# Patient Record
Sex: MALE | Race: WHITE | NOT HISPANIC OR LATINO | Employment: OTHER | URBAN - METROPOLITAN AREA
[De-identification: names, ages, dates, MRNs, and addresses within clinical notes are randomized per-mention and may not be internally consistent; named-entity substitution may affect disease eponyms.]

---

## 2017-03-24 ENCOUNTER — TRANSCRIBE ORDERS (OUTPATIENT)
Dept: ADMINISTRATIVE | Facility: HOSPITAL | Age: 82
End: 2017-03-24

## 2017-03-24 DIAGNOSIS — C44.219 BASAL CELL CARCINOMA OF LEFT EAR: Primary | ICD-10-CM

## 2017-04-03 ENCOUNTER — APPOINTMENT (OUTPATIENT)
Dept: INFUSION THERAPY | Facility: HOSPITAL | Age: 82
End: 2017-04-03
Attending: DERMATOLOGY

## 2017-04-04 ENCOUNTER — HOSPITAL ENCOUNTER (OUTPATIENT)
Dept: INFUSION THERAPY | Facility: HOSPITAL | Age: 82
Discharge: HOME | End: 2017-04-04
Attending: DERMATOLOGY

## 2017-04-04 RX ORDER — LIDOCAINE HYDROCHLORIDE AND EPINEPHRINE 10; 10 MG/ML; UG/ML
20 INJECTION, SOLUTION INFILTRATION; PERINEURAL ONCE
Status: DISCONTINUED | OUTPATIENT
Start: 2017-04-04 | End: 2017-04-07 | Stop reason: HOSPADM

## 2018-09-07 ENCOUNTER — HOSPITAL ENCOUNTER (EMERGENCY)
Facility: HOSPITAL | Age: 83
Discharge: HOME | End: 2018-09-07
Attending: EMERGENCY MEDICINE | Admitting: EMERGENCY MEDICINE

## 2018-09-07 ENCOUNTER — APPOINTMENT (OUTPATIENT)
Dept: CT IMAGING | Facility: HOSPITAL | Age: 83
End: 2018-09-07

## 2018-09-07 VITALS
SYSTOLIC BLOOD PRESSURE: 143 MMHG | OXYGEN SATURATION: 97 % | WEIGHT: 145 LBS | DIASTOLIC BLOOD PRESSURE: 84 MMHG | BODY MASS INDEX: 22.76 KG/M2 | TEMPERATURE: 97.7 F | RESPIRATION RATE: 15 BRPM | HEIGHT: 67 IN | HEART RATE: 54 BPM
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
## 2018-09-07 DIAGNOSIS — G20 PARKINSON'S DISEASE (HCC): ICD-10-CM

## 2018-09-07 DIAGNOSIS — F03.90 DEMENTIA WITHOUT BEHAVIORAL DISTURBANCE, UNSPECIFIED DEMENTIA TYPE: ICD-10-CM

## 2018-09-07 LAB — GLUCOSE BLDC GLUCOMTR-MCNC: 89 MG/DL (ref 70–130)

## 2018-09-07 PROCEDURE — 72125 CT NECK SPINE W/O DYE: CPT

## 2018-09-07 PROCEDURE — 70450 CT HEAD/BRAIN W/O DYE: CPT

## 2018-09-07 PROCEDURE — 99284 EMERGENCY DEPT VISIT MOD MDM: CPT

## 2018-09-07 PROCEDURE — 82962 GLUCOSE BLOOD TEST: CPT

## 2018-09-07 RX ORDER — LANOLIN ALCOHOL/MO/W.PET/CERES
6 CREAM (GRAM) TOPICAL NIGHTLY
COMMUNITY

## 2018-09-07 NOTE — ED PROVIDER NOTES
EMERGENCY DEPARTMENT ENCOUNTER    CHIEF COMPLAINT  Chief Complaint: Head Injury   History given by: Pt and family  History limited by: Dementia   Room Number: 33/33  PMD: Provider, No Known      HPI:  Pt is a 89 y.o. male who presents from memory care unit after sustaining an unwitnessed fall in his shower earlier today.  He is demented and cannot give much history.  He was transported via EMS on long board and c-collar. His daughter shemar him here at the ER and supplements the history.    Duration:  PTA to ED  Onset: sudden from fall  Timing: constant  Location: head  Radiation: none  Quality: pain  Intensity/Severity: moderate  Progression: unchanged  Associated Symptoms: none  Aggravating Factors: none  Alleviating Factors: none  Previous Episodes: Pt has hx of falls.   Treatment before arrival: none stated     PAST MEDICAL HISTORY  Active Ambulatory Problems     Diagnosis Date Noted   • No Active Ambulatory Problems     Resolved Ambulatory Problems     Diagnosis Date Noted   • No Resolved Ambulatory Problems     Past Medical History:   Diagnosis Date   • Dementia    • Parkinson's disease (CMS/HCC)    • Skin cancer        PAST SURGICAL HISTORY  Past Surgical History:   Procedure Laterality Date   • BRAIN STIMULATOR     • COLON SURGERY     • HERNIA REPAIR         FAMILY HISTORY  History reviewed. No pertinent family history.    SOCIAL HISTORY  Social History     Social History   • Marital status: Single     Spouse name: N/A   • Number of children: N/A   • Years of education: N/A     Occupational History   • Not on file.     Social History Main Topics   • Smoking status: Never Smoker   • Smokeless tobacco: Never Used   • Alcohol use No   • Drug use: No   • Sexual activity: Defer     Other Topics Concern   • Not on file     Social History Narrative   • No narrative on file       ALLERGIES  Sulfa antibiotics    REVIEW OF SYSTEMS  Review of Systems   Unable to perform ROS: Dementia   Musculoskeletal: Negative for neck  pain.   Neurological:        Pt had head injury.        PHYSICAL EXAM  ED Triage Vitals   Temp Heart Rate Resp BP SpO2   09/07/18 1358 09/07/18 1357 09/07/18 1358 09/07/18 1357 09/07/18 1357   97.7 °F (36.5 °C) 56 18 145/74 97 %      Temp src Heart Rate Source Patient Position BP Location FiO2 (%)   09/07/18 1358 -- -- -- --   Oral           Physical Exam   Constitutional: He is oriented to person, place, and time. No distress.   HENT:   Head: Normocephalic and atraumatic.   Eyes: Pupils are equal, round, and reactive to light. EOM are normal.   Neck: Normal range of motion. Neck supple.   Cardiovascular: Normal rate, regular rhythm and normal heart sounds.    Pulmonary/Chest: Effort normal and breath sounds normal. No respiratory distress.   Abdominal: Soft. There is no tenderness. There is no rebound and no guarding.   Musculoskeletal: Normal range of motion. He exhibits no edema.   Neurological: He is alert and oriented to person, place, and time. He has normal sensation and normal strength.   Speech mumbling, but is at baseline per daughter. Moves all extremities well.    Skin: Skin is warm and dry.   Psychiatric: Mood and affect normal.   Nursing note and vitals reviewed.      LAB RESULTS  Lab Results (last 24 hours)     Procedure Component Value Units Date/Time    POC Glucose Once [[id number]]  (Normal) Collected:  09/07/18 1411    Specimen:  Blood Updated:  09/07/18 1423     Glucose 89 mg/dL     Narrative:       Meter: [id number] : [id number] Meryl VILLEGAS I ordered the above labs and reviewed the results    RADIOLOGY  CT Head Without Contrast   Preliminary Result   No acute process.       CT OF THE CERVICAL SPINE WITHOUT CONTRAST.       TECHNIQUE: Axial images were obtained from the skull base to the upper   thoracic spine.       Sagittal and coronal reconstruction images were reviewed.       There is degenerative change of the C1-C2 articulation.       There is minimal anterolisthesis of C6  on C7. No other subluxation is   seen. There is mild multilevel cervical spondylosis. No significant   spinal canal stenosis is seen. Multilevel facet joint arthropathy is   seen.       No cervical spine fractures are seen.       IMPRESSION:    1. Multilevel cervical degenerative disease.   2. No cervical spine fractures are seen.       Radiation dose reduction techniques were utilized, including automated   exposure control and exposure modulation based on body size.              CT Cervical Spine Without Contrast   Preliminary Result   No acute process.       CT OF THE CERVICAL SPINE WITHOUT CONTRAST.       TECHNIQUE: Axial images were obtained from the skull base to the upper   thoracic spine.       Sagittal and coronal reconstruction images were reviewed.       There is degenerative change of the C1-C2 articulation.       There is minimal anterolisthesis of C6 on C7. No other subluxation is   seen. There is mild multilevel cervical spondylosis. No significant   spinal canal stenosis is seen. Multilevel facet joint arthropathy is   seen.       No cervical spine fractures are seen.       IMPRESSION:    1. Multilevel cervical degenerative disease.   2. No cervical spine fractures are seen.       Radiation dose reduction techniques were utilized, including automated   exposure control and exposure modulation based on body size.                   I ordered the above noted radiological studies. Interpreted by radiologist. Reviewed by me in PACS.       PROCEDURES  Procedures      PROGRESS AND CONSULTS       1439  CT Head, CT Cervical Spine ordered.       MEDICAL DECISION MAKING  Results were reviewed/discussed with the patient and they were also made aware of online access. Pt also made aware that some labs, such as cultures, will not be resulted during ER visit and follow up with PMD is necessary.     MDM  Number of Diagnoses or Management Options  Dementia without behavioral disturbance, unspecified dementia type:    Fall, initial encounter:   Parkinson's disease (CMS/[facility]):      Amount and/or Complexity of Data Reviewed  Clinical lab tests: reviewed and ordered (Glucose=89)  Tests in the radiology section of CPT®: reviewed and ordered (CT Head- nothing acute.   CT C-Spine- nothing acute. )  Decide to obtain previous medical records or to obtain history from someone other than the patient: yes  Obtain history from someone other than the patient: yes (Pt's family. )  Review and summarize past medical records: yes  Independent visualization of images, tracings, or specimens: yes           DIAGNOSIS  Final diagnoses:   Fall, initial encounter   Dementia without behavioral disturbance, unspecified dementia type   Parkinson's disease (CMS/[facility])       DISPOSITION  DISCHARGE    Patient discharged in stable condition.    Reviewed implications of results, diagnosis, meds, responsibility to follow up, warning signs and symptoms of possible worsening, potential complications and reasons to return to ER.    Patient/Family voiced understanding of above instructions.    Discussed plan for discharge, as there is no emergent indication for admission. Patient referred to primary care provider for BP management due to today's BP. Pt/family is agreeable and understands need for follow up and repeat testing.  Pt is aware that discharge does not mean that nothing is wrong but it indicates no emergency is present that requires admission and they must continue care with follow-up as given below or physician of their choice.     FOLLOW-UP  PATIENT LIAISON [facility] [address] [phone]  Schedule an appointment as soon as possible for a visit   As needed    [facility]  [address] [facility] [address] [phone]    As needed         Medication List      No changes were made to your prescriptions during this visit.           Latest Documented Vital Signs:  As of 5:08 PM  BP-  145/74 HR- 56 Temp- 97.7 °F (36.5 °C) (Oral) O2 sat- 97%    --  Documentation assistance provided by romeo Arevalo for Dr. Parr. Information recorded by the romeo was done at my direction and has been verified and validated by me.            Shannon Gilliland  09/07/18 8430       Justin Parr MD  09/07/18 0901

## 2018-09-07 NOTE — ED NOTES
"Pt to room 33 via ems.  Pt on backboard and collar.  Reported pt lives at [facility] and fell in shower.  Emergency contact Mayra Netherton called to get pt baseline status.  Was told that pt has dementia and parkinson's and that baseline is confusion and slurred slow speech.  Stated that she would call daughter and get her to call us with history and med list.  Pt lives at [facility] assisted living in \"[facility] area\" per Mayra.      Prashanth Brown RN  09/07/18 5377    "

## 2019-02-18 ENCOUNTER — APPOINTMENT (OUTPATIENT)
Dept: GENERAL RADIOLOGY | Facility: HOSPITAL | Age: 84
End: 2019-02-18

## 2019-02-18 ENCOUNTER — HOSPITAL ENCOUNTER (EMERGENCY)
Facility: HOSPITAL | Age: 84
Discharge: HOME | End: 2019-02-18
Attending: EMERGENCY MEDICINE | Admitting: EMERGENCY MEDICINE

## 2019-02-18 ENCOUNTER — APPOINTMENT (OUTPATIENT)
Dept: CT IMAGING | Facility: HOSPITAL | Age: 84
End: 2019-02-18

## 2019-02-18 VITALS
HEIGHT: 68 IN | DIASTOLIC BLOOD PRESSURE: 74 MMHG | RESPIRATION RATE: 18 BRPM | WEIGHT: 136.1 LBS | BODY MASS INDEX: 20.63 KG/M2 | OXYGEN SATURATION: 92 % | SYSTOLIC BLOOD PRESSURE: 122 MMHG | TEMPERATURE: 99.4 F | HEART RATE: 61 BPM

## 2019-02-18 DIAGNOSIS — S70.212A ABRASION OF LEFT HIP, INITIAL ENCOUNTER: ICD-10-CM
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
## 2019-02-18 DIAGNOSIS — S80.212A ABRASION OF LEFT KNEE, INITIAL ENCOUNTER: ICD-10-CM

## 2019-02-18 DIAGNOSIS — S09.90XA CLOSED HEAD INJURY, INITIAL ENCOUNTER: ICD-10-CM

## 2019-02-18 DIAGNOSIS — S00.83XA TRAUMATIC HEMATOMA OF FOREHEAD, INITIAL ENCOUNTER: ICD-10-CM

## 2019-02-18 DIAGNOSIS — R52 PAIN: ICD-10-CM

## 2019-02-18 DIAGNOSIS — S51.012A SKIN TEAR OF LEFT ELBOW WITHOUT COMPLICATION, INITIAL ENCOUNTER: ICD-10-CM

## 2019-02-18 PROCEDURE — 90471 IMMUNIZATION ADMIN: CPT | Performed by: EMERGENCY MEDICINE

## 2019-02-18 PROCEDURE — 73560 X-RAY EXAM OF KNEE 1 OR 2: CPT

## 2019-02-18 PROCEDURE — 90715 TDAP VACCINE 7 YRS/> IM: CPT | Performed by: EMERGENCY MEDICINE

## 2019-02-18 PROCEDURE — 73502 X-RAY EXAM HIP UNI 2-3 VIEWS: CPT

## 2019-02-18 PROCEDURE — 25010000002 TDAP 5-2.5-18.5 LF-MCG/0.5 SUSPENSION: Performed by: EMERGENCY MEDICINE

## 2019-02-18 PROCEDURE — 99283 EMERGENCY DEPT VISIT LOW MDM: CPT

## 2019-02-18 PROCEDURE — 70450 CT HEAD/BRAIN W/O DYE: CPT

## 2019-02-18 PROCEDURE — 72125 CT NECK SPINE W/O DYE: CPT

## 2019-02-18 PROCEDURE — 73070 X-RAY EXAM OF ELBOW: CPT

## 2019-02-18 RX ADMIN — TETANUS TOXOID, REDUCED DIPHTHERIA TOXOID AND ACELLULAR PERTUSSIS VACCINE, ADSORBED 0.5 ML: 5; 2.5; 8; 8; 2.5 SUSPENSION INTRAMUSCULAR at 02:57

## 2019-02-18 NOTE — ED NOTES
Pt here for fall at nursing facility.  Hematoma middle of forehead, skin tear left knee, abrasion left lateral knee, abrasion to left hip.  Pt moves all extremities without obvious pain.  No acute distress noted at this time.      Netta Wang RN  02/18/19 0115

## 2019-02-18 NOTE — ED PROVIDER NOTES
EMERGENCY DEPARTMENT ENCOUNTER    Room Number:  20/20  Date seen:  2/18/2019  Time seen: 1:02 AM  PCP: System, Provider Not In  Historian: patient      HPI:  Chief Complaint: head injury  A complete HPI/ROS/PMH/PSH/SH/FH are unobtainable due to: dementia    Context: Nayely Wang is a 89 y.o. male who presents to the ED with a reported head injury after falling around 0000 tonight. Pt reportedly fell while walking from his bed to the bathroom. There was no reported LOC. Pt is unable to provide any history.    Location: forehead  Radiation: none  Quality: contusion  Intensity/Severity: unknown  Duration: one hour  Onset quality: sudden  Timing: constant  Progression: unchanged  Aggravating Factors: unknown  Alleviating Factors: unknown  Previous Episodes: none mentioned  Treatment before arrival: none  Associated Symptoms: unknown    PAST MEDICAL HISTORY  Active Ambulatory Problems     Diagnosis Date Noted   • No Active Ambulatory Problems     Resolved Ambulatory Problems     Diagnosis Date Noted   • No Resolved Ambulatory Problems     Past Medical History:   Diagnosis Date   • Dementia    • Parkinson's disease (CMS/HCC)    • Skin cancer          PAST SURGICAL HISTORY  Past Surgical History:   Procedure Laterality Date   • BRAIN STIMULATOR     • COLON SURGERY     • HERNIA REPAIR           FAMILY HISTORY  No family history on file.      SOCIAL HISTORY  Social History     Socioeconomic History   • Marital status: Single     Spouse name: Not on file   • Number of children: Not on file   • Years of education: Not on file   • Highest education level: Not on file   Social Needs   • Financial resource strain: Not on file   • Food insecurity - worry: Not on file   • Food insecurity - inability: Not on file   • Transportation needs - medical: Not on file   • Transportation needs - non-medical: Not on file   Occupational History   • Not on file   Tobacco Use   • Smoking status: Never Smoker   • Smokeless tobacco: Never  Used   Substance and Sexual Activity   • Alcohol use: No   • Drug use: No   • Sexual activity: Defer   Other Topics Concern   • Not on file   Social History Narrative   • Not on file         ALLERGIES  Sulfa antibiotics        REVIEW OF SYSTEMS  Review of Systems   Unable to perform ROS: Dementia            PHYSICAL EXAM  ED Triage Vitals [02/18/19 0058]   Temp Heart Rate Resp BP SpO2   99.4 °F (37.4 °C) 64 20 128/70 95 %      Temp src Heart Rate Source Patient Position BP Location FiO2 (%)   Tympanic Monitor Sitting Left arm --         GENERAL: not distressed, appears elderly  HENT: nares patent, hematoma to middle forehead  EYES: no scleral icterus  NECK: no C-Spine tenderness, FROM of C-Spine without pain  CV: regular rhythm, regular rate, 2+ DP pulses bilaterally  RESPIRATORY: normal effort, no chest wall tenderness, CTAB  ABDOMEN: soft, non-tender  MUSCULOSKELETAL: no deformity, no bony tenderness to arms bilaterally, no focal tenderness to the legs, pelvis stable, no T-Spine or L-Spine tenderness  NEURO: alert, HICKEY, FC  SKIN: warm, dry, skin tear to left elbow, bruising to the left knee, superficial abrasion to left lateral knee, superficial abrasion over left lateral hip    Vital signs and nursing notes reviewed.        RADIOLOGY  XR Elbow 2 View Left   Final Result   No acute fracture or subluxation identified.       This report was finalized on 2/18/2019 2:40 AM by Dr. Tiny Timmons M.D.          XR Knee 1 or 2 View Bilateral   Final Result   No acute fractures or subluxation is identified, although the patient   may have a suprapatellar effusion.       This report was finalized on 2/18/2019 2:38 AM by Dr. Tiny Timmons M.D.          XR Hip With or Without Pelvis 2 - 3 View Left   Final Result   No acute fracture or subluxation of the left hip is identified.       This report was finalized on 2/18/2019 2:37 AM by Dr. Tiny Timmons M.D.          CT Cervical Spine Without Contrast   Final  Result   No acute fracture or subluxation.       Radiation dose reduction techniques were utilized, including automated   exposure control and exposure modulation based on body size.       This report was finalized on 2/18/2019 2:01 AM by Dr. Tiny Timmons M.D.          CT Head Without Contrast   Final Result       1. No acute hemorrhage.   2. Extensive sinus inflammatory changes. Acute sinusitis is not   excluded.       Radiation dose reduction techniques were utilized, including automated   exposure control and exposure modulation based on body size.       This report was finalized on 2/18/2019 1:55 AM by Dr. Tiny Timmons M.D.                 Ordered the above noted radiological studies. Reviewed by me in PACS.      PROCEDURES  Procedures      MEDICATIONS GIVEN IN ER  Medications   Tdap (BOOSTRIX) injection 0.5 mL (not administered)       PROGRESS AND CONSULTS     0109- Discussed the plan to order imaging studies for further evaluation. Pt declines pain medication but agrees with the plan.     0111- Ordered L elbow XR, L hip XR, bilateral knee XR, CT Head and CT C-Spine for further evaluation. Ordered TDap for tetanus prevention.      MEDICAL DECISION MAKING      MDM  Number of Diagnoses or Management Options  Abrasion of left hip, initial encounter:   Abrasion of left knee, initial encounter:   Closed head injury, initial encounter:   Fall, initial encounter:   Skin tear of left elbow without complication, initial encounter:   Traumatic hematoma of forehead, initial encounter:   Diagnosis management comments: No traumatic injury identified. At neuro baseline. Denies any pain and none identified on exam.        Amount and/or Complexity of Data Reviewed  Tests in the radiology section of CPT®: ordered and reviewed (CT Head shows nothing acute)  Decide to obtain previous medical records or to obtain history from someone other than the patient: yes  Review and summarize past medical records: yes (Pt was  last seen in the ED on 9/7/18 for a head injury s/p unwitnessed fall. Pt's CT head showed nothing acute and was discharged.)  Independent visualization of images, tracings, or specimens: yes (No fracture)               DIAGNOSIS  Final diagnoses:   Fall, initial encounter   Traumatic hematoma of forehead, initial encounter   Closed head injury, initial encounter   Skin tear of left elbow without complication, initial encounter   Abrasion of left hip, initial encounter   Abrasion of left knee, initial encounter         DISPOSITION  Discharge            Latest Documented Vital Signs:  As of 2:45 AM  BP- 128/70 HR- 64 Temp- 99.4 °F (37.4 °C) (Tympanic) O2 sat- 95%        --  Documentation assistance provided by romeo Jacobs for Dr. Mau MD.  Information recorded by the scribe was done at my direction and has been verified and validated by me.                 Candy Jacobs  02/18/19 0211       Jose Alfredo León II, MD  02/18/19 0244

## 2019-02-18 NOTE — ED NOTES
[facility] called for transport.  Spoke with Carol, given 2 hours ETA.      Netta Wang RN  02/18/19 5204